# Patient Record
Sex: MALE | URBAN - METROPOLITAN AREA
[De-identification: names, ages, dates, MRNs, and addresses within clinical notes are randomized per-mention and may not be internally consistent; named-entity substitution may affect disease eponyms.]

---

## 2024-10-23 ENCOUNTER — OFFICE VISIT (OUTPATIENT)
Dept: SURGERY | Facility: CLINIC | Age: 59
End: 2024-10-23

## 2024-10-23 VITALS
TEMPERATURE: 98 F | SYSTOLIC BLOOD PRESSURE: 122 MMHG | HEART RATE: 67 BPM | WEIGHT: 173 LBS | RESPIRATION RATE: 16 BRPM | DIASTOLIC BLOOD PRESSURE: 75 MMHG | OXYGEN SATURATION: 98 %

## 2024-10-23 DIAGNOSIS — R22.32 MASS OF LEFT FOREARM: ICD-10-CM

## 2024-10-23 DIAGNOSIS — L98.9 SKIN LESION OF LEFT ARM: Primary | ICD-10-CM

## 2024-10-23 PROCEDURE — 88305 TISSUE EXAM BY PATHOLOGIST: CPT | Performed by: SURGERY

## 2024-10-23 PROCEDURE — 3078F DIAST BP <80 MM HG: CPT | Performed by: SURGERY

## 2024-10-23 PROCEDURE — 88321 CONSLTJ&REPRT SLD PREP ELSWR: CPT | Performed by: SURGERY

## 2024-10-23 PROCEDURE — 3074F SYST BP LT 130 MM HG: CPT | Performed by: SURGERY

## 2024-10-23 NOTE — PATIENT INSTRUCTIONS
1.Change gauze as needed if it becomes saturated. You may remove gauze and shower after 2 days or as directed.  2. Do not soak in water or go swimming until incision is healed.  3. After showering, you may leave incision uncovered and open to air or cover with gauze and tape.  4. You may take Tylenol or Advil for pain.  5. Call our office at [phone] if you have excessive bleeding, redness, drainage, pain or fever.  6. Schedule your follow up appointment as directed.

## 2024-10-23 NOTE — PROCEDURES
Surgical Oncology Procedure Note    Preoperative diagnosis: Mass left forearm    Postoperative diagnosis: Mass left forearm    Procedure: Excision mass left forearm    Surgeon: Derrick Bowers MD      Anesthesia: local    Procedure: Patient was brought to the procedure room placed in a supine position and was prepped and draped in the normal sterile fashion.  1% lidocaine was used as local anesthetic.  A longitudinal incision overlying about 3 cm superficial soft tissue tumor of the left forearm was made and deepened.  The mass was encountered, circumferentially dissected, excised in toto, and sent to pathology.  Hemostasis was achieved and maintained.  The wound was infiltrated with quarter percent Marcaine and closed in layers using 3-0 and 4-0 Vicryl sutures.  Sterile dressings applied.  Instructions and follow-up discussed.  Patient tolerated procedure well without immediate complications.    Derrick Bowers MD FACS